# Patient Record
Sex: FEMALE | Race: WHITE | Employment: OTHER | URBAN - NONMETROPOLITAN AREA
[De-identification: names, ages, dates, MRNs, and addresses within clinical notes are randomized per-mention and may not be internally consistent; named-entity substitution may affect disease eponyms.]

---

## 2017-05-02 PROBLEM — M81.0 OSTEOPOROSIS, UNSPECIFIED: Status: ACTIVE | Noted: 2017-05-02

## 2017-07-17 ENCOUNTER — HOSPITAL ENCOUNTER (OUTPATIENT)
Dept: PHYSICAL THERAPY | Age: 72
Setting detail: THERAPIES SERIES
Discharge: HOME | End: 2017-07-17
Payer: MEDICARE

## 2017-07-17 PROCEDURE — 97140 MANUAL THERAPY 1/> REGIONS: CPT

## 2017-07-17 ASSESSMENT — PAIN DESCRIPTION - PAIN TYPE: TYPE: CHRONIC PAIN

## 2017-07-17 ASSESSMENT — PAIN DESCRIPTION - ORIENTATION: ORIENTATION: LOWER

## 2017-07-17 ASSESSMENT — PAIN SCALES - GENERAL: PAINLEVEL_OUTOF10: 5

## 2017-07-17 ASSESSMENT — PAIN DESCRIPTION - LOCATION: LOCATION: BACK

## 2017-07-17 ASSESSMENT — PAIN DESCRIPTION - DESCRIPTORS: DESCRIPTORS: ACHING;THROBBING;SHARP;SHOOTING

## 2017-07-20 ENCOUNTER — HOSPITAL ENCOUNTER (OUTPATIENT)
Dept: PHYSICAL THERAPY | Age: 72
Setting detail: THERAPIES SERIES
Discharge: HOME | End: 2017-07-20
Payer: MEDICARE

## 2017-07-20 PROCEDURE — 97140 MANUAL THERAPY 1/> REGIONS: CPT

## 2017-07-20 ASSESSMENT — PAIN DESCRIPTION - PAIN TYPE: TYPE: CHRONIC PAIN

## 2017-07-20 ASSESSMENT — PAIN DESCRIPTION - LOCATION: LOCATION: BACK

## 2017-07-20 ASSESSMENT — PAIN DESCRIPTION - ORIENTATION: ORIENTATION: LOWER

## 2017-07-20 ASSESSMENT — PAIN SCALES - GENERAL: PAINLEVEL_OUTOF10: 6

## 2017-07-20 ASSESSMENT — PAIN DESCRIPTION - DESCRIPTORS: DESCRIPTORS: ACHING;THROBBING;SHOOTING;SHARP

## 2017-07-24 ENCOUNTER — HOSPITAL ENCOUNTER (OUTPATIENT)
Dept: PHYSICAL THERAPY | Age: 72
Setting detail: THERAPIES SERIES
Discharge: HOME | End: 2017-07-24
Payer: MEDICARE

## 2017-07-26 ENCOUNTER — HOSPITAL ENCOUNTER (OUTPATIENT)
Dept: PHYSICAL THERAPY | Age: 72
Setting detail: THERAPIES SERIES
Discharge: HOME | End: 2017-07-26
Payer: MEDICARE

## 2017-07-26 PROCEDURE — 97140 MANUAL THERAPY 1/> REGIONS: CPT

## 2017-07-26 ASSESSMENT — PAIN DESCRIPTION - PAIN TYPE: TYPE: CHRONIC PAIN

## 2017-07-26 ASSESSMENT — PAIN SCALES - GENERAL: PAINLEVEL_OUTOF10: 5

## 2017-07-26 ASSESSMENT — PAIN DESCRIPTION - ORIENTATION: ORIENTATION: LOWER

## 2017-07-26 ASSESSMENT — PAIN DESCRIPTION - LOCATION: LOCATION: BACK

## 2017-07-26 ASSESSMENT — PAIN DESCRIPTION - DESCRIPTORS: DESCRIPTORS: ACHING;THROBBING;SORE

## 2017-08-02 ENCOUNTER — HOSPITAL ENCOUNTER (OUTPATIENT)
Dept: PHYSICAL THERAPY | Age: 72
Setting detail: THERAPIES SERIES
Discharge: HOME | End: 2017-08-02
Payer: MEDICARE

## 2017-08-02 PROCEDURE — 97140 MANUAL THERAPY 1/> REGIONS: CPT

## 2017-08-02 ASSESSMENT — PAIN DESCRIPTION - ORIENTATION: ORIENTATION: LOWER

## 2017-08-02 ASSESSMENT — PAIN DESCRIPTION - LOCATION: LOCATION: BACK

## 2017-08-02 ASSESSMENT — PAIN DESCRIPTION - PAIN TYPE: TYPE: CHRONIC PAIN

## 2017-08-02 ASSESSMENT — PAIN SCALES - GENERAL: PAINLEVEL_OUTOF10: 5

## 2017-08-02 ASSESSMENT — PAIN DESCRIPTION - DESCRIPTORS: DESCRIPTORS: ACHING;THROBBING;SORE

## 2017-08-08 ENCOUNTER — HOSPITAL ENCOUNTER (OUTPATIENT)
Dept: PHYSICAL THERAPY | Age: 72
Setting detail: THERAPIES SERIES
Discharge: HOME | End: 2017-08-08
Payer: MEDICARE

## 2017-08-10 ENCOUNTER — HOSPITAL ENCOUNTER (OUTPATIENT)
Dept: PHYSICAL THERAPY | Age: 72
Setting detail: THERAPIES SERIES
Discharge: HOME | End: 2017-08-10
Payer: MEDICARE

## 2017-08-10 ENCOUNTER — HOSPITAL ENCOUNTER (OUTPATIENT)
Dept: WOMENS IMAGING | Age: 72
Discharge: HOME | End: 2017-08-10
Payer: MEDICARE

## 2017-08-10 DIAGNOSIS — Z12.31 VISIT FOR SCREENING MAMMOGRAM: ICD-10-CM

## 2017-08-10 PROCEDURE — G0202 SCR MAMMO BI INCL CAD: HCPCS

## 2017-08-10 PROCEDURE — 97140 MANUAL THERAPY 1/> REGIONS: CPT

## 2017-08-11 ASSESSMENT — PAIN SCALES - GENERAL: PAINLEVEL_OUTOF10: 7

## 2017-08-11 ASSESSMENT — PAIN DESCRIPTION - PAIN TYPE: TYPE: CHRONIC PAIN

## 2017-08-11 ASSESSMENT — PAIN DESCRIPTION - ORIENTATION: ORIENTATION: LOWER

## 2017-08-11 ASSESSMENT — PAIN DESCRIPTION - DESCRIPTORS: DESCRIPTORS: THROBBING;ACHING

## 2017-08-11 ASSESSMENT — PAIN DESCRIPTION - LOCATION: LOCATION: BACK

## 2017-08-16 ENCOUNTER — HOSPITAL ENCOUNTER (OUTPATIENT)
Dept: PHYSICAL THERAPY | Age: 72
Setting detail: THERAPIES SERIES
Discharge: HOME | End: 2017-08-16
Payer: MEDICARE

## 2017-08-16 PROCEDURE — 97140 MANUAL THERAPY 1/> REGIONS: CPT

## 2017-08-16 ASSESSMENT — PAIN DESCRIPTION - ORIENTATION: ORIENTATION: LOWER

## 2017-08-16 ASSESSMENT — PAIN DESCRIPTION - LOCATION: LOCATION: BACK

## 2017-08-16 ASSESSMENT — PAIN DESCRIPTION - PAIN TYPE: TYPE: CHRONIC PAIN

## 2017-08-16 ASSESSMENT — PAIN DESCRIPTION - DESCRIPTORS: DESCRIPTORS: THROBBING;SHARP

## 2017-08-16 ASSESSMENT — PAIN SCALES - GENERAL: PAINLEVEL_OUTOF10: 5

## 2017-08-23 ENCOUNTER — HOSPITAL ENCOUNTER (OUTPATIENT)
Dept: PHYSICAL THERAPY | Age: 72
Setting detail: THERAPIES SERIES
Discharge: HOME | End: 2017-08-23
Payer: MEDICARE

## 2017-08-23 PROCEDURE — 97140 MANUAL THERAPY 1/> REGIONS: CPT

## 2017-08-23 ASSESSMENT — PAIN DESCRIPTION - PAIN TYPE: TYPE: CHRONIC PAIN

## 2017-08-23 ASSESSMENT — PAIN DESCRIPTION - ORIENTATION: ORIENTATION: LOWER

## 2017-08-23 ASSESSMENT — PAIN DESCRIPTION - DESCRIPTORS: DESCRIPTORS: ACHING;THROBBING

## 2017-08-23 ASSESSMENT — PAIN SCALES - GENERAL: PAINLEVEL_OUTOF10: 3

## 2017-08-23 ASSESSMENT — PAIN DESCRIPTION - LOCATION: LOCATION: BACK

## 2017-08-29 ENCOUNTER — HOSPITAL ENCOUNTER (OUTPATIENT)
Dept: PHYSICAL THERAPY | Age: 72
Setting detail: THERAPIES SERIES
Discharge: HOME | End: 2017-08-29
Payer: MEDICARE

## 2017-08-29 PROCEDURE — 97140 MANUAL THERAPY 1/> REGIONS: CPT

## 2017-08-30 ASSESSMENT — PAIN DESCRIPTION - ORIENTATION: ORIENTATION: RIGHT;LOWER

## 2017-08-30 ASSESSMENT — PAIN DESCRIPTION - LOCATION: LOCATION: BACK;LEG

## 2017-08-30 ASSESSMENT — PAIN DESCRIPTION - PAIN TYPE: TYPE: CHRONIC PAIN

## 2017-08-30 ASSESSMENT — PAIN DESCRIPTION - DESCRIPTORS: DESCRIPTORS: ACHING;SORE

## 2017-08-30 ASSESSMENT — PAIN SCALES - GENERAL: PAINLEVEL_OUTOF10: 4

## 2017-09-06 ENCOUNTER — HOSPITAL ENCOUNTER (OUTPATIENT)
Dept: PHYSICAL THERAPY | Age: 72
Setting detail: THERAPIES SERIES
Discharge: HOME | End: 2017-09-06
Payer: MEDICARE

## 2017-09-06 PROCEDURE — 97140 MANUAL THERAPY 1/> REGIONS: CPT

## 2017-09-06 ASSESSMENT — PAIN DESCRIPTION - ORIENTATION: ORIENTATION: LOWER

## 2017-09-06 ASSESSMENT — PAIN SCALES - GENERAL: PAINLEVEL_OUTOF10: 4

## 2017-09-06 ASSESSMENT — PAIN DESCRIPTION - PAIN TYPE: TYPE: CHRONIC PAIN

## 2017-09-06 ASSESSMENT — PAIN DESCRIPTION - DESCRIPTORS: DESCRIPTORS: ACHING;SHARP

## 2017-09-06 ASSESSMENT — PAIN DESCRIPTION - LOCATION: LOCATION: BACK

## 2017-09-07 PROCEDURE — G8990 OTHER PT/OT CURRENT STATUS: HCPCS

## 2017-09-07 PROCEDURE — G8991 OTHER PT/OT GOAL STATUS: HCPCS

## 2017-09-13 ENCOUNTER — HOSPITAL ENCOUNTER (OUTPATIENT)
Dept: PHYSICAL THERAPY | Age: 72
Setting detail: THERAPIES SERIES
Discharge: HOME | End: 2017-09-13
Payer: MEDICARE

## 2017-09-13 PROCEDURE — 97140 MANUAL THERAPY 1/> REGIONS: CPT

## 2017-09-13 ASSESSMENT — PAIN DESCRIPTION - ORIENTATION: ORIENTATION: LEFT;RIGHT;LOWER

## 2017-09-13 ASSESSMENT — PAIN DESCRIPTION - DESCRIPTORS: DESCRIPTORS: ACHING;TENDER;SHARP

## 2017-09-13 ASSESSMENT — PAIN SCALES - GENERAL: PAINLEVEL_OUTOF10: 5

## 2017-09-13 ASSESSMENT — PAIN DESCRIPTION - PAIN TYPE: TYPE: CHRONIC PAIN

## 2017-09-13 ASSESSMENT — PAIN DESCRIPTION - LOCATION: LOCATION: BACK;LEG

## 2017-09-19 ENCOUNTER — HOSPITAL ENCOUNTER (OUTPATIENT)
Dept: PHYSICAL THERAPY | Age: 72
Setting detail: THERAPIES SERIES
Discharge: HOME | End: 2017-09-19
Payer: MEDICARE

## 2017-09-19 PROCEDURE — 97140 MANUAL THERAPY 1/> REGIONS: CPT

## 2017-09-19 ASSESSMENT — PAIN DESCRIPTION - ORIENTATION: ORIENTATION: RIGHT;LOWER

## 2017-09-19 ASSESSMENT — PAIN DESCRIPTION - DESCRIPTORS: DESCRIPTORS: ACHING;TENDER

## 2017-09-19 ASSESSMENT — PAIN DESCRIPTION - PAIN TYPE: TYPE: CHRONIC PAIN

## 2017-09-19 ASSESSMENT — PAIN DESCRIPTION - LOCATION: LOCATION: BACK;LEG

## 2017-09-19 ASSESSMENT — PAIN SCALES - GENERAL: PAINLEVEL_OUTOF10: 5

## 2017-09-26 ENCOUNTER — HOSPITAL ENCOUNTER (OUTPATIENT)
Dept: PHYSICAL THERAPY | Age: 72
Setting detail: THERAPIES SERIES
Discharge: HOME | End: 2017-09-26
Payer: MEDICARE

## 2017-09-26 PROCEDURE — 97140 MANUAL THERAPY 1/> REGIONS: CPT

## 2017-09-26 ASSESSMENT — PAIN DESCRIPTION - LOCATION: LOCATION: BACK

## 2017-09-26 ASSESSMENT — PAIN SCALES - GENERAL: PAINLEVEL_OUTOF10: 5

## 2017-09-26 ASSESSMENT — PAIN DESCRIPTION - PAIN TYPE: TYPE: CHRONIC PAIN

## 2017-09-26 ASSESSMENT — PAIN DESCRIPTION - DESCRIPTORS: DESCRIPTORS: THROBBING;ACHING

## 2017-09-26 ASSESSMENT — PAIN DESCRIPTION - ORIENTATION: ORIENTATION: LOWER

## 2017-10-04 ENCOUNTER — HOSPITAL ENCOUNTER (OUTPATIENT)
Dept: PHYSICAL THERAPY | Age: 72
Setting detail: THERAPIES SERIES
Discharge: HOME | End: 2017-10-04
Payer: MEDICARE

## 2017-10-11 ENCOUNTER — HOSPITAL ENCOUNTER (OUTPATIENT)
Dept: PHYSICAL THERAPY | Age: 72
Setting detail: THERAPIES SERIES
Discharge: HOME | End: 2017-10-11
Payer: MEDICARE

## 2017-10-11 PROCEDURE — 97140 MANUAL THERAPY 1/> REGIONS: CPT

## 2017-10-11 ASSESSMENT — PAIN DESCRIPTION - DESCRIPTORS: DESCRIPTORS: TIGHTNESS;SHOOTING

## 2017-10-11 ASSESSMENT — PAIN DESCRIPTION - PAIN TYPE: TYPE: CHRONIC PAIN

## 2017-10-11 ASSESSMENT — PAIN DESCRIPTION - LOCATION: LOCATION: BACK

## 2017-10-11 ASSESSMENT — PAIN DESCRIPTION - ORIENTATION: ORIENTATION: RIGHT;LEFT

## 2017-10-11 ASSESSMENT — PAIN SCALES - GENERAL: PAINLEVEL_OUTOF10: 7

## 2017-10-11 NOTE — PROGRESS NOTES
Stephanie Krishna 62  LYMPHEDEMA SERVICES  DAILY NOTE    Date: 10/11/2017     Patient Name: Fernando Gutierres        MRN: [id number]    : 1945  (81 y.o.)  Gender: female   Referring Practitioner: Pablo Ware MD  Diagnosis: LYMPHEDEMA (I89.0)  Referral Date : 17    PT Visit Information  PT Insurance Information: MEDICARE ([address] [place] [address]. PT/ST HAVE A $3700 CAP PER CALENDAR YEAR.)  Total # of Visits to Date: 15  Plan of Care/Certification Expiration Date: 17  No Show: 0  Canceled Appointment: 2  Progress Note Counter:     Restrictions/Precautions  Fall risk, Universal precautions,Limited ambulation distance/patient ambulates with a straight cane. Pain  Patient Currently in Pain: Yes (Patient denied pain in lower extremities.)  Pain Assessment  Pain Assessment: 0-10  Pain Level: 7  Pain Type: Chronic pain  Pain Location: Back  Pain Orientation: Right, Left  Pain Descriptors: Tightness, Shooting    SUBJECTIVE     -10/11/2017: The patient presented to the Lymphedema clinic on this date with her lower extremity CIRC AID compression legging intact. She brought in her new CIRC AID compression leggings for the thigh and knee region.   -2017: The patient presented to the Lymphedema clinic on this date with the left lower extremity CIRC AID Compression leggings and right lower extremity compression bandages intact, with Tubigrip stockinettes on bilateral thighs. She denied pain from bandages, but reported having difficulty with keeping the stockinettes on the thighs due to rolling.   -2017: The patient presented to the Lymphedema clinic on this date with the left lower extremity compression bandages and increased swelling in the right lower extremity. However, noted that the wound on the right lower leg is healing well and a scab has been formed. The patient is anxious about the amount of swelling that has increased in the right LE.   -2017: The patient presented to the Lymphedema clinic on this date with the CIRC AID compression legging on the left lower extremity (fluctuating pressure applied to each straps) and Tubigrip stockinette on the right lower extremity. The patient's right leg still has the streristrips intact on the right lower extremity. The patient presented slightly agitated and anxious today until mid-treatment. Patient slightly tearful, \"I need you to help with getting this stuff together, I think I understand everything until I get home, and then I get confused. \" per patient (RE: compression garments).  -09/06/2017: The patient is a pleasant and cooperative 81year old female who has undergone Complete Decongestive Therapy/Manual Lymph Drainage (CDT/MLD) treatment sessions due to having Lymphedema in bilateral lower extremities and lower torso. The patient has demonstrated good progress and verbalized being pleased with progress. \"I like these new wraps better than having to wrap my legs all of the time\" per patient. The patient was referring to the new CIRC AID Compression leggings with velcro closure.       TREATMENT SESSION  Unilateral Lower Extremity Manual Lymph Drainage (MLD):   Left  Trunk:  Effleurage, Profundus, Terminus, Inguinal Lnn, Axillary Lnn and (IA) Anterior Inguinal to Axillary (3 steps)       Lower Extremity:  Thigh (Anterior, Posterior, Lateral, Medial to Lateral), Knee (Anterior, Posterior, Lateral, Medial, Lower Leg (Anterior,Posterior), Ankle (Anterior, Posterior, Lateral, Medial and Foot/Toes (Anterior, Posterior)       Rework  Lower Extremity (Toes to Groin), Inguinal Lnn, Axillary Lnn, (IA) Anterior Inguinal to Axillary, Terminus, Profundus and Effleurage    Skin Care Measures  Washed involved extremity/body part and applied Original Eucerin moisturizing creme and Vaseline to moisturize skin     COMPRESSION GARMENT ASSESSMENT AND FITTING     -BRAND: CIRC AID compression leggings (knee/thigh)     -TYPE: Standard velcro leggings     -COMPRESSION GRADIENT: Recommend no greater than 30 mmHg     -GARMENT SIZE: Small, short     -ADDITIONAL INFORMATION ON GARMENT: --       -HAS A GOOD FIT BEEN ACHIEVED: Yes       -TRAINING: Demonstrated proper donning/doffing of velcro leggings with patient returning the demonstration with moderate assistance from the therapist.  Written/illustrated instructions also provided. Proper wearing schedule provided with good skin care instructed. Decongestive/Therapeutic Exercise  The patient was able to complete Decongestive Therapy exercises (x 10 reps) including ankle pumps (x 2 sets). The exercises were completed with compression bandages on, without complaints of pain from the patient. The Decongestive Therapy exercises assist with decreasing the swelling by increasing the muscle pumping action of the lymph collectors and by increasing the fluid uptake in the lymphatic system. Patient Education  Education Provided:   -10/11/2017: Refer to \"Compression Garment Assessment and Fitting\" above.  -09/26/2017: Refer to Compression Garment training above.  -09/19/2017: Ongoing education.  -09/13/2017: Reviewed plan of care and assisted patient with clarification of compression garments.  -09/06/2017: Reviewed goals/plan of care.   -08/29/2017: Refer to Subjective information above.   -08/23/2017: Refer to Compression Garment Re-Assessment.   Discussed plan of care to assist the patient with measuring and ordering the [address] leggings for the knee/thigh regions bilaterally.   -08/16/2017: Refer to compression garment above.   -08/10/2017: Refer to Subjective information above.   -08/02/2017: Ongoing training.  -07/26/2017: Reviewed plan of care.   -07/20/2017: Discussed plan of care.  -07/17/2017: Reviewed bandaging precautions, plan of care and goals (Patient may benefit from [address] compression leggings due to the patient having difficulty with bending forward and reaching her feet for a prolonged period of time which is required during bandaging and donning compression stockings. Learner:patient  Method: demonstration and explanation       Outcome: acknowledged understanding of goals/plan of care, verbalized concerns, demonstrated understanding and asked questions     GOALS   SHORT-TERM GOALS:  to be met in 4 weeks   X  MET  Patient will demonstrate a decrease in circumferential measurements of the affected extremity by 8.0 cm working towards the lymphedema swelling stabilizing and patient being able to get measured and fitted for a new compression garment to be worn daily to keep swelling down. X  MET  Patient will tolerate wearing the compression bandages from one treatment session until the next treatment session working towards meeting short-term goal #1. X  MET Patient/family/caregiver will demonstrate and verbalize 3-5 skin care precautionary measures to decrease dry skin and risk of infection. X  MET Patient/family/caregiver will demonstrate applying compression bandages/garments with no greater than moderate assist and verbal cues from the therapist working towards being modified independent with applying the compression bandages/garment for night time swelling. LONG-TERM GOALS:  to be met in 10 weeks   X  MET 1. Lymphedema swelling will stabilize as noted by total circumferential changes being no greater than 3.0-5.0 cm in preparation for being measured for new compression garment(s) to be worn daily to keep swelling down. X  NOT MET   (ONGOING) 2. Patient/family/caregiver will demonstrate applying compression bandages with modified independence to apply at night to keep swelling down overnight to be able to cory compression stockings in the morning. X  NOT MET  (ONGOING) 3. Patient/family/caregiver will demonstrate donning/doffing compression garments with modified independence to wear compression garments daily to keep swelling down.    X  NOT MET  (ONGOING)

## 2017-10-18 ENCOUNTER — HOSPITAL ENCOUNTER (OUTPATIENT)
Dept: PHYSICAL THERAPY | Age: 72
Setting detail: THERAPIES SERIES
Discharge: HOME | End: 2017-10-18
Payer: MEDICARE

## 2017-10-18 PROCEDURE — 97140 MANUAL THERAPY 1/> REGIONS: CPT

## 2017-10-18 ASSESSMENT — PAIN SCALES - GENERAL: PAINLEVEL_OUTOF10: 3

## 2017-10-18 ASSESSMENT — PAIN DESCRIPTION - ORIENTATION: ORIENTATION: LOWER

## 2017-10-18 ASSESSMENT — PAIN DESCRIPTION - LOCATION: LOCATION: BACK

## 2017-10-18 ASSESSMENT — PAIN DESCRIPTION - PAIN TYPE: TYPE: CHRONIC PAIN

## 2017-10-18 ASSESSMENT — PAIN DESCRIPTION - DESCRIPTORS: DESCRIPTORS: STABBING;THROBBING

## 2017-10-18 NOTE — PROGRESS NOTES
Posterior, Lateral, Medial and Foot/Toes (Anterior, Posterior)       Rework  Lower Extremity (Toes to Groin), Inguinal Lnn, Axillary Lnn, (IA) Anterior Inguinal to Axillary, Terminus, Profundus and Effleurage    Skin Care Measures  Washed involved extremity/body part and applied Original Eucerin moisturizing creme and Vaseline to moisturize skin     COMPRESSION GARMENT RE-ASSESSMENT AND TRAINING     -BRAND: CIRC AID compression leggings (knee/thigh)     -TYPE: Standard velcro leggings     -COMPRESSION GRADIENT: Recommend no greater than 30 mmHg     -GARMENT SIZE: Small, short     -ADDITIONAL INFORMATION ON GARMENT: --       -HAS A GOOD FIT BEEN ACHIEVED: Yes       -TRAINING: The therapist instructed the patient on the proper doffing of the thigh high compression garments with verbal cues to secure velcro straps when removed. The therapist assisted the patient with the proper donning (using 2 different techniques) with the patient sitting (long-sitting) and donning the compression garments and the other option with the patient loosening the straps but keeping them secured and slipping it on (ie. Donning a pair of pants) and then tightening the straps. The patient required moderate verbal cues and minimal hands on assistance. The therapist also sewed straps on the lower leg velcro garment to assist the patient with donning the lower leggings due to the patient's upper body weakness. Decongestive/Therapeutic Exercise  The patient was able to complete Decongestive Therapy exercises (x 10 reps) including ankle pumps (x 2 sets). The exercises were completed with compression bandages on, without complaints of pain from the patient. The Decongestive Therapy exercises assist with decreasing the swelling by increasing the muscle pumping action of the lymph collectors and by increasing the fluid uptake in the lymphatic system.     Patient Education  Education Provided:   -10/18/2017: Refer to Techtium risk of infection. X  MET Patient/family/caregiver will demonstrate applying compression bandages/garments with no greater than moderate assist and verbal cues from the therapist working towards being modified independent with applying the compression bandages/garment for night time swelling. LONG-TERM GOALS:  to be met in 10 weeks   X  MET 1. Lymphedema swelling will stabilize as noted by total circumferential changes being no greater than 3.0-5.0 cm in preparation for being measured for new compression garment(s) to be worn daily to keep swelling down. X  NOT MET   (ONGOING) 2. Patient/family/caregiver will demonstrate applying compression bandages with modified independence to apply at night to keep swelling down overnight to be able to cory compression stockings in the morning. X  NOT MET  (ONGOING) 3. Patient/family/caregiver will demonstrate donning/doffing compression garments with modified independence to wear compression garments daily to keep swelling down. X  NOT MET  (ONGOING) 4. Patient/family/caregiver will verbalize a good understanding regarding proper wearing and replacement schedule, precautions and care of garment(s). ASSESSMENT:  Assessment:  Patient progressing toward goal achievement. Activity Tolerance:  Patient tolerance of treatment: Fair. Plan: Week of October 30 (1x)-possible discharge.          Time In: 1300   Time Out: 1400   Timed Code Minutes: 60   Untimed Code Minutes: 0   Total Treatment Time: Sanjay Monique, P.T. #[id number], [facility]    10/18/2017

## 2017-11-01 ENCOUNTER — HOSPITAL ENCOUNTER (OUTPATIENT)
Dept: PHYSICAL THERAPY | Age: 72
Setting detail: THERAPIES SERIES
Discharge: HOME | End: 2017-11-01
Payer: MEDICARE

## 2017-11-17 ENCOUNTER — HOSPITAL ENCOUNTER (OUTPATIENT)
Dept: PHYSICAL THERAPY | Age: 72
Setting detail: THERAPIES SERIES
Discharge: HOME | End: 2017-11-17
Payer: MEDICARE

## 2017-11-30 ENCOUNTER — HOSPITAL ENCOUNTER (OUTPATIENT)
Dept: PHYSICAL THERAPY | Age: 72
Setting detail: THERAPIES SERIES
Discharge: HOME | End: 2017-11-30
Payer: MEDICARE

## 2017-11-30 PROCEDURE — G8991 OTHER PT/OT GOAL STATUS: HCPCS

## 2017-11-30 PROCEDURE — 97140 MANUAL THERAPY 1/> REGIONS: CPT

## 2017-11-30 PROCEDURE — G8992 OTHER PT/OT  D/C STATUS: HCPCS

## 2017-11-30 NOTE — PROGRESS NOTES
Frederick Keller 62  LYMPHEDEMA SERVICES  DAILY NOTE& DISCHARGE NOTE    Date: 2017     Patient Name: Julien Keith        MRN: [id number]    : 1945  (81 y.o.)  Gender: female   Referring Practitioner: Nino Momin MD  Diagnosis: LYMPHEDEMA (I89.0)  Referral Date : 17    PT Visit Information  PT Insurance Information: MEDICARE ([address] [place] [address]. PT/ST HAVE A $3700 CAP PER CALENDAR YEAR.)  Total # of Visits to Date: 12  Plan of Care/Certification Expiration Date: 17  No Show: 0  Canceled Appointment: 2  Progress Note Counter:     Restrictions/Precautions  Fall risk, Universal precautions,Limited ambulation distance/patient ambulates with a straight cane. Pain  Patient Currently in Pain: Denies (Patient denied pain in lower extremities.)    SUBJECTIVE     -2017: The patient presented to the Lymphedema clinic on this date with bilateral lower extremity CIRC AID compression leggings and left thigh/knee high CIRC AID leggings intact. She patient reported being pleased with the treatment results, especially for bilateral lower legs. However, she reported that she continues to have difficulty with donning thigh/knee compression leggings and was unable to apply the right thigh/knee properly. OBJECTIVE  Lymphedema Assessment:  Pitting Edema None   Skin Appearance/Condition Mild Redness     Skin Condition Normal   Open Wound(s) No   Tissue Temperature Normal   Skin Folds None    Fibrotic Tissue Minimal    Orange Peel Texture None   Nailbed Appearance/Condition WFL    Leakage of Fluid Amount: None   Measurements  -LOCATION: Metatarsal heads to knee joint line.   -RIGHT: 266.7 cm  -LEFT: 269.5 cm       COMPRESSION GARMENT RE-TRAINING     -BRAND: CIRC AID compression leggings (knee/thigh)     -TYPE: Standard velcro leggings     -COMPRESSION GRADIENT: Recommend no greater than 30 mmHg     -GARMENT SIZE: Small, short     -ADDITIONAL INFORMATION ON GARMENT: -- -HAS A GOOD FIT BEEN ACHIEVED: Yes       -TRAINING: The therapist continued hands on training for the proper application of the thigh/knee high CIRC AID compression leggings with moderate verbal cues and minimal hands on assistance. The patient gets very anxious and needs verbal cues to relax and to use both hands due to bilateral upper extremity weakness to secure the velcro straps. Decongestive/Therapeutic Exercise  The patient was able to complete Decongestive Therapy exercises (x 5 reps) including (standing) heel raises, toe raises, shallow knee bends, hip abduction and marching in place. The exercises were completed with compression bandages on, without complaints of pain from the patient. The Decongestive Therapy exercises assist with decreasing the swelling by increasing the muscle pumping action of the lymph collectors and by increasing the fluid uptake in the lymphatic system. Patient Education  Education Provided:   -11/30/2017: Reviewed exercises, compression garment application training, goals and plan of care for discharge instructions. -10/18/2017: Refer to \"Compression Garment Reassessment and training\" above. -10/11/2017: Refer to \"Compression Garment Assessment and Fitting\" above.  -09/26/2017: Refer to Compression Garment training above.  -09/19/2017: Ongoing education.  -09/13/2017: Reviewed plan of care and assisted patient with clarification of compression garments.  -09/06/2017: Reviewed goals/plan of care.   -08/29/2017: Refer to Subjective information above.   -08/23/2017: Refer to Compression Garment Re-Assessment.   Discussed plan of care to assist the patient with measuring and ordering the [address] leggings for the knee/thigh regions bilaterally.   -08/16/2017: Refer to compression garment above.   -08/10/2017: Refer to Subjective information above.   -08/02/2017: Ongoing training.  -07/26/2017: Reviewed plan of care.   -07/20/2017: Discussed plan of 3. Patient/family/caregiver will demonstrate donning/doffing compression garments with modified independence to wear compression garments daily to keep swelling down. X  MET   4. Patient/family/caregiver will verbalize a good understanding regarding proper wearing and replacement schedule, precautions and care of garment(s). ASSESSMENT:  Assessment:  The patient is a pleasant and cooperative, although anxious 81year old female who has made good progress with the Complete Decongestive Therapy/Manual Lymph Drainage (CDT/MLD) treatments as noted by the followin) The patient has demonstrated a decrease in total circumferential measurements in comparison to previous measurements by 11.5 cm on the right LE and 11.8 cm on the left LE. 2) The patient has met 4/4 short term goals and 4/4 long term goals. 3) The patient has demonstrated a decrease in firm/fibrotic tissue from moderate to mild as noted by the increase in skin stretch during MLD and by softened tissue texture. 4) The patient has demonstrated decreased pain/discomfort from 7 to 0 (in legs). 5) The patient has demonstrated decreased dry skin. Activity Tolerance:  Patient tolerance of treatment: Fair (+)     Plan: DISCHARGE FROM LYMPHEDEMA PROGRAM WITH THE PATIENT CONTINUING WITH HOME PROGRAM.    PT G-Codes  Functional Assessment Tool Used: LYMPHEDEMA LIFE IMPACT SCALE  Score: 20%  Functional Limitation: Other PT primary  Other PT Primary Goal Status (): At least 40 percent but less than 60 percent impaired, limited or restricted  Other PT Primary Discharge Status (): At least 1 percent but less than 20 percent impaired, limited or restricted    Time In: 1355   Time Out: 1405   Timed Code Minutes: 70   Untimed Code Minutes: 0   Total Treatment Time: 79     [place] [address] DR. Ashlyn Flaherty MD FOR ALLOWING US TO ASSIST IN THE CARE AND TREATMENT OF THIS PATIENT!       Dustin Stern, P.T. #[id number],LES, KAREN    2017

## 2018-01-03 ENCOUNTER — INITIAL CONSULT (OUTPATIENT)
Dept: PULMONOLOGY | Age: 73
End: 2018-01-03
Payer: MEDICARE

## 2018-01-03 VITALS
WEIGHT: 154.4 LBS | HEIGHT: 69 IN | BODY MASS INDEX: 22.87 KG/M2 | SYSTOLIC BLOOD PRESSURE: 118 MMHG | HEART RATE: 85 BPM | DIASTOLIC BLOOD PRESSURE: 63 MMHG | OXYGEN SATURATION: 97 %

## 2018-01-03 DIAGNOSIS — G47.33 OSA (OBSTRUCTIVE SLEEP APNEA): Primary | ICD-10-CM

## 2018-01-03 DIAGNOSIS — G47.10 HYPERSOMNIA: ICD-10-CM

## 2018-01-03 DIAGNOSIS — F32.A DEPRESSION, UNSPECIFIED DEPRESSION TYPE: ICD-10-CM

## 2018-01-03 PROCEDURE — 1123F ACP DISCUSS/DSCN MKR DOCD: CPT | Performed by: INTERNAL MEDICINE

## 2018-01-03 PROCEDURE — 1090F PRES/ABSN URINE INCON ASSESS: CPT | Performed by: INTERNAL MEDICINE

## 2018-01-03 PROCEDURE — G8420 CALC BMI NORM PARAMETERS: HCPCS | Performed by: INTERNAL MEDICINE

## 2018-01-03 PROCEDURE — G8484 FLU IMMUNIZE NO ADMIN: HCPCS | Performed by: INTERNAL MEDICINE

## 2018-01-03 PROCEDURE — 1036F TOBACCO NON-USER: CPT | Performed by: INTERNAL MEDICINE

## 2018-01-03 PROCEDURE — 99203 OFFICE O/P NEW LOW 30 MIN: CPT | Performed by: INTERNAL MEDICINE

## 2018-01-03 PROCEDURE — 3014F SCREEN MAMMO DOC REV: CPT | Performed by: INTERNAL MEDICINE

## 2018-01-03 PROCEDURE — 3017F COLORECTAL CA SCREEN DOC REV: CPT | Performed by: INTERNAL MEDICINE

## 2018-01-03 PROCEDURE — 4040F PNEUMOC VAC/ADMIN/RCVD: CPT | Performed by: INTERNAL MEDICINE

## 2018-01-03 PROCEDURE — G8427 DOCREV CUR MEDS BY ELIG CLIN: HCPCS | Performed by: INTERNAL MEDICINE

## 2018-01-03 PROCEDURE — G8400 PT W/DXA NO RESULTS DOC: HCPCS | Performed by: INTERNAL MEDICINE

## 2018-01-03 NOTE — PROGRESS NOTES
facility-administered medications for this visit. Family History   Problem Relation Age of Onset   Abdoul Johnson Cancer Mother     Diabetes Father     Heart Disease Father      CHF        Review of Systems   General/Constitutional: She lost 151lbs of weight from her old sleep test with normal appetite . No fever or chills. HENT: Negative. Eyes: Negative. Upper respiratory tract: Frequent nasal stuffiness especially on right side with no post nasal drip. She is using Afrin nasal spray. Lower respiratory tract/ lungs: No cough or sputum production. No hemoptysis. Cardiovascular: No palpitations or chest pain. Gastrointestinal: No nausea or vomiting. Neurological: No focal neurologiacal weakness. Extremities: Chronic leg edema. Musculoskeletal: No complaints. Genitourinary: No complaints. Hematological: Negative. Psychiatric/Behavioral: Negative. Skin: No itching. /63 (Site: Left Arm, Position: Sitting, Cuff Size: Large Adult)   Pulse 85   Ht 5' 9\" (1.753 m)   Wt 154 lb 6.4 oz (70 kg)   SpO2 97% Comment: RA at rest  BMI 22.80 kg/m²   Mallampati airway Class:4  Neck Circumference: 14. 25Inches  Montrose sleepiness score 1/3/18: 13     Physical Exam   Nursing note and vitals reviewed. Constitutional: Patient appears moderately built and moderately nourished. No distress. Patient is oriented to person, place, and time. HENT: Deviated nasal septum to right side. Head: Normocephalic and atraumatic. Right Ear: External ear normal.   Left Ear: External ear normal.   Mouth/Throat: Oropharynx is clear and moist.  No oral thrush. Eyes: Conjunctivae are normal. Pupils are equal, round, and reactive to light. No scleral icterus. Neck: Neck supple. No JVD present. No tracheal deviation present. Cardiovascular: Normal rate, regular rhythm, normal heart sounds. No murmur heard. Pulmonary/Chest: Effort normal and breath sounds normal. No stridor. No respiratory distress. No wheezes. No rales.

## 2018-01-08 ENCOUNTER — TELEPHONE (OUTPATIENT)
Dept: SLEEP CENTER | Age: 73
End: 2018-01-08

## 2018-01-08 NOTE — TELEPHONE ENCOUNTER
Jossy Jose Eduardo called to confirm her sleep study and expressed great concern about the accuracy of her medication list. She states the list is incorrect and that she has given the correct medications, dosages, route and how she is taking the mediation to the MA at her appointment. . However when she received her after visit summary the list was still wrong causing her to worry. I did let her know that sometimes the updates and changes are made but are not reflected on the after visit summary. She asked me make a note of this.    Thank you  Raúl Mills, CMA

## 2018-01-09 ENCOUNTER — HOSPITAL ENCOUNTER (OUTPATIENT)
Dept: SLEEP CENTER | Age: 73
Discharge: HOME | End: 2018-01-09
Payer: MEDICARE

## 2018-01-09 DIAGNOSIS — G47.33 OSA (OBSTRUCTIVE SLEEP APNEA): ICD-10-CM

## 2018-01-09 DIAGNOSIS — G47.10 HYPERSOMNIA: ICD-10-CM

## 2018-01-09 PROCEDURE — 95810 POLYSOM 6/> YRS 4/> PARAM: CPT

## 2018-01-10 LAB — STATUS: NORMAL

## 2018-01-11 NOTE — PROGRESS NOTES
followed the  standard put forth by the American Academy of Sleep Medicine and utilized  the 4A obstructive hypopnea event desaturation of 4 percent or greater. INTERPRETATION:  This is a baseline sleep study and the study was performed  on 01/09/2018. The study was started at 10:17 p.m. and was terminated at  5:17 a.m. with a total recording time of 419.6 minutes and sleep period  time was 397.2 minutes. Total sleep time was 329.7 minutes and overall  sleep efficiency was 78.6%. The sleep onset latency was 22.5 minutes and  wake after sleep onset was 67.5 minutes and REM sleep latency was 303.5  minutes. SLEEP STAGING AND DISTRIBUTION SUMMARY:  Revealed the patient spent 80.5  minutes in stage I consisting of 24.4%, 206.7 minutes in stage II  consisting of 62.7%, 24 minutes in stage III consisting of 7.3%, 18.5  minutes in REM sleep consisting of 5.6% of total sleep time. RESPIRATORY EVENT ANALYSIS:  Revealed the patient did not have any apneas. The patient had a total of 1 obstructive hypopnea event. The total number  of apneas and hypopneas recorded during the study were 1 with  apnea-hypopnea index of 0.2. The patient's REM sleep apnea-hypopnea index  was 3.2. POSITION ANALYSIS:  Revealed the patient spent 66.7 minutes in supine  position, 143 minutes in left lateral position, 120 minutes in right  lateral position with supine apnea-hypopnea index of 0.9 and at left  lateral position, apnea-hypopneas was 5. In the right lateral position,  apnea-hypopnea index was 1.5. PERIODIC LIMB MOVEMENT ANALYSIS:  Revealed the patient had a total of 152  periodic limb movements. Out of 152, 13 of them were sleep arousals with a  PLM index of 27.7, PLM arousal index is 2.4. The patient had a total of 21  spontaneous arousals with a spontaneous arousal index of 3.8. OXYGEN SATURATION MONITORING:  Revealed the patient had a maximum oxygen  desaturation to 86% with a mean oxygen saturation of 94%.

## 2018-02-14 ENCOUNTER — HOSPITAL ENCOUNTER (OUTPATIENT)
Dept: WOMENS IMAGING | Age: 73
Discharge: HOME | End: 2018-02-14
Payer: MEDICARE

## 2018-02-14 DIAGNOSIS — M81.0 OSTEOPOROSIS WITHOUT CURRENT PATHOLOGICAL FRACTURE, UNSPECIFIED OSTEOPOROSIS TYPE: ICD-10-CM

## 2018-02-14 PROCEDURE — 77080 DXA BONE DENSITY AXIAL: CPT

## 2018-03-09 ENCOUNTER — TELEPHONE (OUTPATIENT)
Dept: PULMONOLOGY | Age: 73
End: 2018-03-09

## 2018-03-09 NOTE — TELEPHONE ENCOUNTER
Pt cancelled appt due to wanting to f/u with family . She received results from family . Patsy Wood does not see the need to come here at this time. Pt stated she is not on a CPAP.

## 2018-03-13 ENCOUNTER — HOSPITAL ENCOUNTER (OUTPATIENT)
Age: 73
Discharge: HOME | End: 2018-03-13
Payer: MEDICARE

## 2018-03-13 ENCOUNTER — HOSPITAL ENCOUNTER (OUTPATIENT)
Dept: GENERAL RADIOLOGY | Age: 73
Discharge: HOME | End: 2018-03-13
Payer: MEDICARE

## 2018-03-13 DIAGNOSIS — M25.561 RIGHT KNEE PAIN, UNSPECIFIED CHRONICITY: ICD-10-CM

## 2018-03-13 PROCEDURE — 73562 X-RAY EXAM OF KNEE 3: CPT

## 2018-03-14 ENCOUNTER — HOSPITAL ENCOUNTER (EMERGENCY)
Age: 73
Discharge: HOME | End: 2018-03-14
Payer: MEDICARE

## 2018-03-14 VITALS
WEIGHT: 153 LBS | HEART RATE: 95 BPM | DIASTOLIC BLOOD PRESSURE: 72 MMHG | BODY MASS INDEX: 23.19 KG/M2 | SYSTOLIC BLOOD PRESSURE: 141 MMHG | RESPIRATION RATE: 16 BRPM | OXYGEN SATURATION: 96 % | TEMPERATURE: 98.2 F | HEIGHT: 68 IN

## 2018-03-14 DIAGNOSIS — M17.11 OSTEOARTHRITIS OF RIGHT KNEE, UNSPECIFIED OSTEOARTHRITIS TYPE: Primary | ICD-10-CM

## 2018-03-14 PROCEDURE — 99283 EMERGENCY DEPT VISIT LOW MDM: CPT

## 2018-03-14 PROCEDURE — 6370000000 HC RX 637 (ALT 250 FOR IP): Performed by: NURSE PRACTITIONER

## 2018-03-14 RX ORDER — LIDOCAINE 50 MG/G
1 PATCH TOPICAL DAILY
Qty: 30 PATCH | Refills: 0 | Status: SHIPPED | OUTPATIENT
Start: 2018-03-14 | End: 2019-04-15

## 2018-03-14 RX ORDER — HYDROCODONE BITARTRATE AND ACETAMINOPHEN 5; 325 MG/1; MG/1
1 TABLET ORAL EVERY 6 HOURS PRN
Qty: 6 TABLET | Refills: 0 | Status: SHIPPED | OUTPATIENT
Start: 2018-03-14 | End: 2018-03-16

## 2018-03-14 RX ORDER — LIDOCAINE 50 MG/G
OINTMENT TOPICAL
Qty: 1 TUBE | Refills: 0 | Status: SHIPPED | OUTPATIENT
Start: 2018-03-14 | End: 2019-04-15

## 2018-03-14 RX ORDER — HYDROCODONE BITARTRATE AND ACETAMINOPHEN 5; 325 MG/1; MG/1
1 TABLET ORAL ONCE
Status: COMPLETED | OUTPATIENT
Start: 2018-03-14 | End: 2018-03-14

## 2018-03-14 RX ORDER — LIDOCAINE 50 MG/G
1 PATCH TOPICAL ONCE
Status: DISCONTINUED | OUTPATIENT
Start: 2018-03-14 | End: 2018-03-14 | Stop reason: HOSPADM

## 2018-03-14 RX ADMIN — HYDROCODONE BITARTRATE AND ACETAMINOPHEN 1 TABLET: 5; 325 TABLET ORAL at 13:59

## 2018-03-14 ASSESSMENT — PAIN DESCRIPTION - PAIN TYPE: TYPE: ACUTE PAIN

## 2018-03-14 ASSESSMENT — ENCOUNTER SYMPTOMS
SHORTNESS OF BREATH: 0
ABDOMINAL PAIN: 0

## 2018-03-14 ASSESSMENT — PAIN DESCRIPTION - FREQUENCY: FREQUENCY: INTERMITTENT

## 2018-03-14 ASSESSMENT — PAIN SCALES - GENERAL: PAINLEVEL_OUTOF10: 9

## 2018-03-14 ASSESSMENT — PAIN DESCRIPTION - LOCATION: LOCATION: KNEE

## 2018-03-14 ASSESSMENT — PAIN DESCRIPTION - ORIENTATION: ORIENTATION: RIGHT;INNER

## 2018-03-14 ASSESSMENT — PAIN DESCRIPTION - DESCRIPTORS: DESCRIPTORS: ACHING

## 2018-03-14 NOTE — ED PROVIDER NOTES
recognition program.  Efforts were made to edit the dictations but occasionally words are mis-transcribed.)    Provider:  I personally performed the services described in the documentation, reviewed and edited the documentation which was dictated to the scribe in my presence, and it accurately records my words and actions.     Uriel Encinas CNP 03/14/18 2:19 PM    Ila Esteves, [address] [place] [address], [facility]  03/14/18 [id number]

## 2018-04-19 ENCOUNTER — HOSPITAL ENCOUNTER (OUTPATIENT)
Dept: NURSING | Age: 73
Discharge: HOME | End: 2018-04-19
Payer: MEDICARE

## 2018-04-19 VITALS
SYSTOLIC BLOOD PRESSURE: 138 MMHG | TEMPERATURE: 97.8 F | RESPIRATION RATE: 18 BRPM | DIASTOLIC BLOOD PRESSURE: 78 MMHG | BODY MASS INDEX: 23.11 KG/M2 | WEIGHT: 152 LBS | HEART RATE: 88 BPM

## 2018-04-19 DIAGNOSIS — M81.0 OSTEOPOROSIS, UNSPECIFIED OSTEOPOROSIS TYPE, UNSPECIFIED PATHOLOGICAL FRACTURE PRESENCE: ICD-10-CM

## 2018-04-19 PROCEDURE — 96365 THER/PROPH/DIAG IV INF INIT: CPT

## 2018-04-19 PROCEDURE — 6360000002 HC RX W HCPCS: Performed by: FAMILY MEDICINE

## 2018-04-19 RX ORDER — ZOLEDRONIC ACID 5 MG/100ML
5 INJECTION, SOLUTION INTRAVENOUS ONCE
Status: COMPLETED | OUTPATIENT
Start: 2018-04-19 | End: 2018-04-19

## 2018-04-19 RX ORDER — ZOLEDRONIC ACID 5 MG/100ML
5 INJECTION, SOLUTION INTRAVENOUS ONCE
Status: CANCELLED | OUTPATIENT
Start: 2018-04-19 | End: 2018-04-19

## 2018-04-19 RX ADMIN — ZOLEDRONIC ACID 5 MG: 5 INJECTION, SOLUTION INTRAVENOUS at 14:30

## 2018-04-19 ASSESSMENT — PAIN - FUNCTIONAL ASSESSMENT: PAIN_FUNCTIONAL_ASSESSMENT: 0-10

## 2018-04-19 ASSESSMENT — PAIN DESCRIPTION - DESCRIPTORS: DESCRIPTORS: ACHING

## 2018-04-19 NOTE — PROGRESS NOTES
[id number] Alert female admitted for reclast procedure reviewed with pt verbalized understanding. Pt rights and responsibilities offered to pt to read. Labs on chart.

## 2018-04-19 NOTE — PROGRESS NOTES
1425 Received patient from Shakila Apple.   1454 Discharge instructions given to patient verbalized understanding   1510 Discharge to home in stable condition with aid of cane    __m__ Safety:       (Environmental)   [place] to environment   Ensure ID band is correct and in place/ allergy band as needed   Assess for fall risk   Initiate fall precautions as applicable (fall band, side rails, etc.)   Call light within reach   Bed in low position/ wheels locked    __m__ Pain:        Assess pain level and characteristics   Administer analgesics as ordered   Assess effectiveness of pain management and report to MD as needed    ___m_ Knowledge Deficit:   Assess baseline knowledge   Provide teaching at level of understanding   Provide teaching via preferred learning method   Evaluate teaching effectiveness    __m__ Hemodynamic/Respiratory Status:       (Pre and Post Procedure Monitoring)   Assess/Monitor vital signs and LOC   Assess Baseline SpO2 prior to any sedation   Obtain weight/height   Assess vital signs/ LOC until patient meets discharge criteria   Monitor procedure site and notify MD of any issues

## 2019-03-05 ENCOUNTER — HOSPITAL ENCOUNTER (OUTPATIENT)
Dept: WOMENS IMAGING | Age: 74
Discharge: HOME | End: 2019-03-05
Payer: MEDICARE

## 2019-03-05 DIAGNOSIS — Z12.31 VISIT FOR SCREENING MAMMOGRAM: ICD-10-CM

## 2019-03-05 PROCEDURE — 77063 BREAST TOMOSYNTHESIS BI: CPT

## 2019-04-15 ENCOUNTER — HOSPITAL ENCOUNTER (OUTPATIENT)
Dept: NURSING | Age: 74
Discharge: HOME | End: 2019-04-15
Payer: MEDICARE

## 2019-04-15 VITALS
BODY MASS INDEX: 23.26 KG/M2 | RESPIRATION RATE: 16 BRPM | SYSTOLIC BLOOD PRESSURE: 150 MMHG | DIASTOLIC BLOOD PRESSURE: 66 MMHG | TEMPERATURE: 97.4 F | HEART RATE: 77 BPM | WEIGHT: 153 LBS

## 2019-04-15 DIAGNOSIS — M81.0 OSTEOPOROSIS, UNSPECIFIED OSTEOPOROSIS TYPE, UNSPECIFIED PATHOLOGICAL FRACTURE PRESENCE: Primary | ICD-10-CM

## 2019-04-15 PROCEDURE — 2709999900 HC NON-CHARGEABLE SUPPLY

## 2019-04-15 PROCEDURE — 96365 THER/PROPH/DIAG IV INF INIT: CPT

## 2019-04-15 PROCEDURE — 6360000002 HC RX W HCPCS: Performed by: FAMILY MEDICINE

## 2019-04-15 RX ORDER — ARIPIPRAZOLE 5 MG/1
5 TABLET ORAL DAILY
COMMUNITY

## 2019-04-15 RX ORDER — ZOLEDRONIC ACID 5 MG/100ML
5 INJECTION, SOLUTION INTRAVENOUS ONCE
Status: COMPLETED | OUTPATIENT
Start: 2019-04-15 | End: 2019-04-15

## 2019-04-15 RX ORDER — ZOLEDRONIC ACID 5 MG/100ML
5 INJECTION, SOLUTION INTRAVENOUS ONCE
Status: CANCELLED | OUTPATIENT
Start: 2019-04-15

## 2019-04-15 RX ORDER — FAMOTIDINE 20 MG/1
20 TABLET, FILM COATED ORAL PRN
COMMUNITY

## 2019-04-15 RX ADMIN — ZOLEDRONIC ACID 5 MG: 5 INJECTION, SOLUTION INTRAVENOUS at 11:31

## 2019-04-15 ASSESSMENT — PAIN - FUNCTIONAL ASSESSMENT: PAIN_FUNCTIONAL_ASSESSMENT: 0-10

## 2019-04-15 NOTE — PROGRESS NOTES
1115 PT RIGHTS AND RESPONSIBILITIES OFFERED TO PT  1115 pt admitted to [facility] per ambulation for a reclast infusion. Pt met criteria for a reclast infusion  1132 infusion started   1150 infusion completed. Pt given discharge instructions . 1200 pt discharged per ambulation to home. Pt stable.          ___m_ Safety:       (Environmental)   [place] to environment   Ensure ID band is correct and in place/ allergy band as needed   Assess for fall risk   Initiate fall precautions as applicable (fall band, side rails, etc.)   Call light within reach   Bed in low position/ wheels locked    _m___ Pain:        Assess pain level and characteristics   Administer analgesics as ordered   Assess effectiveness of pain management and report to MD as needed    __m__ Knowledge Deficit:   Assess baseline knowledge   Provide teaching at level of understanding   Provide teaching via preferred learning method   Evaluate teaching effectiveness    __m__ Hemodynamic/Respiratory Status:       (Pre and Post Procedure Monitoring)   Assess/Monitor vital signs and LOC   Assess Baseline SpO2 prior to any sedation   Obtain weight/height   Assess vital signs/ LOC until patient meets discharge criteria   Monitor procedure site and notify MD of any issues

## 2019-04-15 NOTE — PROGRESS NOTES
1115:  ARRIVES VIA  FOR RECLAST.   PROCESS REVIEWED AND PT RIGHTS AND RESPONSIBILITIES OFFERED TO PT.

## 2021-01-08 ENCOUNTER — HOSPITAL ENCOUNTER (OUTPATIENT)
Dept: WOMENS IMAGING | Age: 76
Discharge: HOME | End: 2021-01-08
Payer: MEDICARE

## 2021-01-08 DIAGNOSIS — M81.0 OSTEOPOROSIS, UNSPECIFIED OSTEOPOROSIS TYPE, UNSPECIFIED PATHOLOGICAL FRACTURE PRESENCE: ICD-10-CM

## 2021-01-08 PROCEDURE — 77080 DXA BONE DENSITY AXIAL: CPT

## 2021-01-12 RX ORDER — ZOLEDRONIC ACID 5 MG/100ML
5 INJECTION, SOLUTION INTRAVENOUS ONCE
Status: CANCELLED | OUTPATIENT
Start: 2021-01-12 | End: 2021-01-12

## 2021-01-12 RX ORDER — SODIUM CHLORIDE 9 MG/ML
INJECTION, SOLUTION INTRAVENOUS ONCE
Status: CANCELLED | OUTPATIENT
Start: 2021-01-12 | End: 2021-01-12

## 2021-01-12 RX ORDER — SODIUM CHLORIDE 0.9 % (FLUSH) 0.9 %
10 SYRINGE (ML) INJECTION PRN
Status: CANCELLED | OUTPATIENT
Start: 2021-01-12

## 2021-01-12 RX ORDER — HEPARIN SODIUM (PORCINE) LOCK FLUSH IV SOLN 100 UNIT/ML 100 UNIT/ML
500 SOLUTION INTRAVENOUS PRN
Status: CANCELLED | OUTPATIENT
Start: 2021-01-12

## 2021-01-12 RX ORDER — SODIUM CHLORIDE 0.9 % (FLUSH) 0.9 %
5 SYRINGE (ML) INJECTION PRN
Status: CANCELLED | OUTPATIENT
Start: 2021-01-12

## 2021-01-15 ENCOUNTER — HOSPITAL ENCOUNTER (OUTPATIENT)
Dept: NURSING | Age: 76
End: 2021-01-15
Payer: COMMERCIAL

## 2021-01-26 ENCOUNTER — HOSPITAL ENCOUNTER (OUTPATIENT)
Dept: NURSING | Age: 76
Discharge: HOME | End: 2021-01-26
Payer: COMMERCIAL

## 2021-02-10 ENCOUNTER — HOSPITAL ENCOUNTER (OUTPATIENT)
Dept: NURSING | Age: 76
Discharge: HOME | End: 2021-02-10
Payer: MEDICARE

## 2021-02-10 VITALS
WEIGHT: 156 LBS | RESPIRATION RATE: 18 BRPM | TEMPERATURE: 98.1 F | BODY MASS INDEX: 23.72 KG/M2 | OXYGEN SATURATION: 98 % | SYSTOLIC BLOOD PRESSURE: 157 MMHG | HEART RATE: 78 BPM | DIASTOLIC BLOOD PRESSURE: 84 MMHG

## 2021-02-10 DIAGNOSIS — M81.0 OSTEOPOROSIS, UNSPECIFIED OSTEOPOROSIS TYPE, UNSPECIFIED PATHOLOGICAL FRACTURE PRESENCE: Primary | ICD-10-CM

## 2021-02-10 PROCEDURE — 96365 THER/PROPH/DIAG IV INF INIT: CPT

## 2021-02-10 PROCEDURE — 6360000002 HC RX W HCPCS: Performed by: FAMILY MEDICINE

## 2021-02-10 PROCEDURE — 2580000003 HC RX 258: Performed by: FAMILY MEDICINE

## 2021-02-10 RX ORDER — SODIUM CHLORIDE 0.9 % (FLUSH) 0.9 %
5 SYRINGE (ML) INJECTION PRN
Status: CANCELLED | OUTPATIENT
Start: 2021-02-10

## 2021-02-10 RX ORDER — SODIUM CHLORIDE 9 MG/ML
INJECTION, SOLUTION INTRAVENOUS ONCE
Status: DISCONTINUED | OUTPATIENT
Start: 2021-02-10 | End: 2021-02-10

## 2021-02-10 RX ORDER — HEPARIN SODIUM (PORCINE) LOCK FLUSH IV SOLN 100 UNIT/ML 100 UNIT/ML
500 SOLUTION INTRAVENOUS PRN
Status: CANCELLED | OUTPATIENT
Start: 2021-02-10

## 2021-02-10 RX ORDER — ZOLEDRONIC ACID 5 MG/100ML
5 INJECTION, SOLUTION INTRAVENOUS ONCE
Status: COMPLETED | OUTPATIENT
Start: 2021-02-10 | End: 2021-02-10

## 2021-02-10 RX ORDER — SODIUM CHLORIDE 9 MG/ML
INJECTION, SOLUTION INTRAVENOUS ONCE
Status: CANCELLED | OUTPATIENT
Start: 2021-02-10 | End: 2021-02-10

## 2021-02-10 RX ORDER — ZOLEDRONIC ACID 5 MG/100ML
5 INJECTION, SOLUTION INTRAVENOUS ONCE
Status: CANCELLED | OUTPATIENT
Start: 2021-02-10 | End: 2021-02-10

## 2021-02-10 RX ORDER — AMITRIPTYLINE HYDROCHLORIDE 25 MG/1
25 TABLET, FILM COATED ORAL NIGHTLY
COMMUNITY

## 2021-02-10 RX ORDER — SODIUM CHLORIDE 0.9 % (FLUSH) 0.9 %
10 SYRINGE (ML) INJECTION PRN
Status: CANCELLED | OUTPATIENT
Start: 2021-02-10

## 2021-02-10 RX ADMIN — ZOLEDRONIC ACID 5 MG: 5 INJECTION, SOLUTION INTRAVENOUS at 12:44

## 2021-02-10 RX ADMIN — SODIUM CHLORIDE: 9 INJECTION, SOLUTION INTRAVENOUS at 12:45

## 2021-02-10 ASSESSMENT — PAIN - FUNCTIONAL ASSESSMENT: PAIN_FUNCTIONAL_ASSESSMENT: 0-10

## 2021-02-10 ASSESSMENT — PAIN DESCRIPTION - DESCRIPTORS: DESCRIPTORS: PRESSURE;TIGHTNESS

## 2021-02-10 NOTE — PROGRESS NOTES
Michael 89 REVIEWED WITH PT VERBALIZED UNDERSTANDING. PT MEETS CRITERIA FOR MEDICATION. Pt rights and responsibilities offered to pt to read. __M__ Safety:       (Environmental)  ? [place] to environment  ? Ensure ID band is correct and in place/ allergy band as needed  ? Assess for fall risk  ? Initiate fall precautions as applicable (fall band, side rails, etc.)  ? Call light within reach  ? Bed in low position/ wheels locked    __M__ Pain:       ? Assess pain level and characteristics  ? Administer analgesics as ordered  ? Assess effectiveness of pain management and report to MD as needed    _M___ Knowledge Deficit:  ? Assess baseline knowledge  ? Provide teaching at level of understanding  ? Provide teaching via preferred learning method  ? Evaluate teaching effectiveness    _M___ Hemodynamic/Respiratory Status:  ?       ? Assess vital signs/ LOC until patient meets discharge criteria  ?  Monitor procedure site and notify MD of any issues